# Patient Record
Sex: FEMALE | Race: WHITE | NOT HISPANIC OR LATINO | URBAN - METROPOLITAN AREA
[De-identification: names, ages, dates, MRNs, and addresses within clinical notes are randomized per-mention and may not be internally consistent; named-entity substitution may affect disease eponyms.]

---

## 2023-02-19 ENCOUNTER — EMERGENCY (EMERGENCY)
Facility: HOSPITAL | Age: 58
LOS: 0 days | Discharge: HOME | End: 2023-02-19
Attending: EMERGENCY MEDICINE
Payer: COMMERCIAL

## 2023-02-19 VITALS
SYSTOLIC BLOOD PRESSURE: 105 MMHG | DIASTOLIC BLOOD PRESSURE: 55 MMHG | RESPIRATION RATE: 18 BRPM | HEART RATE: 69 BPM | OXYGEN SATURATION: 100 % | WEIGHT: 119.93 LBS | TEMPERATURE: 97 F | HEIGHT: 64 IN

## 2023-02-19 VITALS
SYSTOLIC BLOOD PRESSURE: 108 MMHG | TEMPERATURE: 98 F | DIASTOLIC BLOOD PRESSURE: 53 MMHG | RESPIRATION RATE: 18 BRPM | HEART RATE: 73 BPM | OXYGEN SATURATION: 96 %

## 2023-02-19 DIAGNOSIS — R11.2 NAUSEA WITH VOMITING, UNSPECIFIED: ICD-10-CM

## 2023-02-19 DIAGNOSIS — F10.99 ALCOHOL USE, UNSPECIFIED WITH UNSPECIFIED ALCOHOL-INDUCED DISORDER: ICD-10-CM

## 2023-02-19 LAB
ALBUMIN SERPL ELPH-MCNC: 3.4 G/DL — SIGNIFICANT CHANGE UP (ref 3.3–5)
ALP SERPL-CCNC: 48 U/L — SIGNIFICANT CHANGE UP (ref 40–120)
ALT FLD-CCNC: 24 U/L — SIGNIFICANT CHANGE UP (ref 12–78)
ANION GAP SERPL CALC-SCNC: 9 MMOL/L — SIGNIFICANT CHANGE UP (ref 5–17)
AST SERPL-CCNC: 21 U/L — SIGNIFICANT CHANGE UP (ref 15–37)
BASOPHILS # BLD AUTO: 0.05 K/UL — SIGNIFICANT CHANGE UP (ref 0–0.2)
BASOPHILS NFR BLD AUTO: 0.9 % — SIGNIFICANT CHANGE UP (ref 0–2)
BILIRUB SERPL-MCNC: <0.1 MG/DL — LOW (ref 0.2–1.2)
BUN SERPL-MCNC: 20 MG/DL — SIGNIFICANT CHANGE UP (ref 7–23)
CALCIUM SERPL-MCNC: 8.3 MG/DL — LOW (ref 8.5–10.1)
CHLORIDE SERPL-SCNC: 113 MMOL/L — HIGH (ref 96–108)
CO2 SERPL-SCNC: 20 MMOL/L — LOW (ref 22–31)
CREAT SERPL-MCNC: 0.9 MG/DL — SIGNIFICANT CHANGE UP (ref 0.5–1.3)
EGFR: 76 ML/MIN/1.73M2 — SIGNIFICANT CHANGE UP
EOSINOPHIL # BLD AUTO: 0.22 K/UL — SIGNIFICANT CHANGE UP (ref 0–0.5)
EOSINOPHIL NFR BLD AUTO: 3.8 % — SIGNIFICANT CHANGE UP (ref 0–6)
ETHANOL SERPL-MCNC: 236 MG/DL — HIGH (ref 0–10)
GLUCOSE SERPL-MCNC: 109 MG/DL — HIGH (ref 70–99)
HCT VFR BLD CALC: 39.4 % — SIGNIFICANT CHANGE UP (ref 34.5–45)
HGB BLD-MCNC: 12.8 G/DL — SIGNIFICANT CHANGE UP (ref 11.5–15.5)
IMM GRANULOCYTES NFR BLD AUTO: 0.3 % — SIGNIFICANT CHANGE UP (ref 0–0.9)
LIDOCAIN IGE QN: 105 U/L — SIGNIFICANT CHANGE UP (ref 73–393)
LYMPHOCYTES # BLD AUTO: 2 K/UL — SIGNIFICANT CHANGE UP (ref 1–3.3)
LYMPHOCYTES # BLD AUTO: 34.2 % — SIGNIFICANT CHANGE UP (ref 13–44)
MCHC RBC-ENTMCNC: 30.5 PG — SIGNIFICANT CHANGE UP (ref 27–34)
MCHC RBC-ENTMCNC: 32.5 GM/DL — SIGNIFICANT CHANGE UP (ref 32–36)
MCV RBC AUTO: 94 FL — SIGNIFICANT CHANGE UP (ref 80–100)
MONOCYTES # BLD AUTO: 0.66 K/UL — SIGNIFICANT CHANGE UP (ref 0–0.9)
MONOCYTES NFR BLD AUTO: 11.3 % — SIGNIFICANT CHANGE UP (ref 2–14)
NEUTROPHILS # BLD AUTO: 2.89 K/UL — SIGNIFICANT CHANGE UP (ref 1.8–7.4)
NEUTROPHILS NFR BLD AUTO: 49.5 % — SIGNIFICANT CHANGE UP (ref 43–77)
PLATELET # BLD AUTO: 142 K/UL — LOW (ref 150–400)
POTASSIUM SERPL-MCNC: 3.1 MMOL/L — LOW (ref 3.5–5.3)
POTASSIUM SERPL-SCNC: 3.1 MMOL/L — LOW (ref 3.5–5.3)
PROT SERPL-MCNC: 6.6 GM/DL — SIGNIFICANT CHANGE UP (ref 6–8.3)
RBC # BLD: 4.19 M/UL — SIGNIFICANT CHANGE UP (ref 3.8–5.2)
RBC # FLD: 12.1 % — SIGNIFICANT CHANGE UP (ref 10.3–14.5)
SODIUM SERPL-SCNC: 142 MMOL/L — SIGNIFICANT CHANGE UP (ref 135–145)
TROPONIN I, HIGH SENSITIVITY RESULT: 5.78 NG/L — SIGNIFICANT CHANGE UP
WBC # BLD: 5.84 K/UL — SIGNIFICANT CHANGE UP (ref 3.8–10.5)
WBC # FLD AUTO: 5.84 K/UL — SIGNIFICANT CHANGE UP (ref 3.8–10.5)

## 2023-02-19 PROCEDURE — 72125 CT NECK SPINE W/O DYE: CPT | Mod: 26,MA

## 2023-02-19 PROCEDURE — 84484 ASSAY OF TROPONIN QUANT: CPT

## 2023-02-19 PROCEDURE — 72125 CT NECK SPINE W/O DYE: CPT | Mod: MA

## 2023-02-19 PROCEDURE — 99285 EMERGENCY DEPT VISIT HI MDM: CPT

## 2023-02-19 PROCEDURE — 36415 COLL VENOUS BLD VENIPUNCTURE: CPT

## 2023-02-19 PROCEDURE — 71045 X-RAY EXAM CHEST 1 VIEW: CPT

## 2023-02-19 PROCEDURE — 70450 CT HEAD/BRAIN W/O DYE: CPT | Mod: 26,MA

## 2023-02-19 PROCEDURE — 80307 DRUG TEST PRSMV CHEM ANLYZR: CPT

## 2023-02-19 PROCEDURE — 71045 X-RAY EXAM CHEST 1 VIEW: CPT | Mod: 26

## 2023-02-19 PROCEDURE — 96374 THER/PROPH/DIAG INJ IV PUSH: CPT

## 2023-02-19 PROCEDURE — 99053 MED SERV 10PM-8AM 24 HR FAC: CPT

## 2023-02-19 PROCEDURE — 83690 ASSAY OF LIPASE: CPT

## 2023-02-19 PROCEDURE — 80053 COMPREHEN METABOLIC PANEL: CPT

## 2023-02-19 PROCEDURE — 70450 CT HEAD/BRAIN W/O DYE: CPT | Mod: MA

## 2023-02-19 PROCEDURE — 96375 TX/PRO/DX INJ NEW DRUG ADDON: CPT

## 2023-02-19 PROCEDURE — 85025 COMPLETE CBC W/AUTO DIFF WBC: CPT

## 2023-02-19 PROCEDURE — 99284 EMERGENCY DEPT VISIT MOD MDM: CPT | Mod: 25

## 2023-02-19 RX ORDER — ONDANSETRON 8 MG/1
4 TABLET, FILM COATED ORAL ONCE
Refills: 0 | Status: COMPLETED | OUTPATIENT
Start: 2023-02-19 | End: 2023-02-19

## 2023-02-19 RX ORDER — POTASSIUM CHLORIDE 20 MEQ
40 PACKET (EA) ORAL ONCE
Refills: 0 | Status: COMPLETED | OUTPATIENT
Start: 2023-02-19 | End: 2023-02-19

## 2023-02-19 RX ORDER — SODIUM CHLORIDE 9 MG/ML
1000 INJECTION INTRAMUSCULAR; INTRAVENOUS; SUBCUTANEOUS ONCE
Refills: 0 | Status: COMPLETED | OUTPATIENT
Start: 2023-02-19 | End: 2023-02-19

## 2023-02-19 RX ORDER — FAMOTIDINE 10 MG/ML
20 INJECTION INTRAVENOUS ONCE
Refills: 0 | Status: COMPLETED | OUTPATIENT
Start: 2023-02-19 | End: 2023-02-19

## 2023-02-19 RX ADMIN — FAMOTIDINE 20 MILLIGRAM(S): 10 INJECTION INTRAVENOUS at 01:25

## 2023-02-19 RX ADMIN — SODIUM CHLORIDE 1000 MILLILITER(S): 9 INJECTION INTRAMUSCULAR; INTRAVENOUS; SUBCUTANEOUS at 01:25

## 2023-02-19 RX ADMIN — Medication 40 MILLIEQUIVALENT(S): at 06:10

## 2023-02-19 RX ADMIN — ONDANSETRON 4 MILLIGRAM(S): 8 TABLET, FILM COATED ORAL at 01:00

## 2023-02-19 NOTE — ED PROVIDER NOTE - PATIENT PORTAL LINK FT
You can access the FollowMyHealth Patient Portal offered by [facility] by registering at the following website: [email or web address]. By joining [email or web address]’s FollowMyHealth portal, you will also be able to view your health information using other applications (apps) compatible with our system.

## 2023-02-19 NOTE — ED PROVIDER NOTE - NSFOLLOWUPINSTRUCTIONS_ED_ALL_ED_FT
Nausea and Vomiting, Adult      Nausea is feeling that you have an upset stomach and that you are about to vomit. Vomiting is when food in your stomach forcefully comes out of your mouth. Vomiting can make you feel weak. If you vomit, or if you are not able to drink enough fluids, you may not have enough water in your body (get dehydrated). If you do not have enough water in your body, you may:  •Feel tired.       •Feel thirsty.       •Have a dry mouth.      •Have cracked lips.      •Pee (urinate) less often.      Older adults and people with other diseases or a weak body defense system (immune system) are at higher risk for not having enough water in the body. If you feel like you may vomit or you vomit, it is important to follow instructions from your doctor about how to take care of yourself.      Follow these instructions at home:    Watch your symptoms for any changes. Tell your doctor about them.      Eating and drinking       A bottle of clear fruit juice and glass of water.       A sign showing that a person should not drink alcohol.     •Take an ORS (oral rehydration solution). This is a drink that is sold at pharmacies and stores.    •Drink clear fluids in small amounts as you are able, such as:  •Water.      •Ice chips.      •Fruit juice that has water added (diluted fruit juice).      •Low-calorie sports drinks.      •Eat bland, easy-to-digest foods in small amounts as you are able, such as:  •Bananas.      •Applesauce.      •Rice.      •Low-fat (lean) meats.      •Toast.      •Crackers.        •Avoid drinking fluids that have a lot of sugar or caffeine in them. This includes energy drinks, sports drinks, and soda.      •Avoid alcohol.      •Avoid spicy or fatty foods.      General instructions     •Take over-the-counter and prescription medicines only as told by your doctor.      •Drink enough fluid to keep your pee (urine) pale yellow.      •Wash your hands often with soap and water for at least 20 seconds. If you cannot use soap and water, use hand .      •Make sure that everyone in your home washes their hands well and often.      •Rest at home until you feel better.      •Watch your condition for any changes.      •Take slow and deep breaths when you feel like you may vomit.      •Keep all follow-up visits.        Contact a doctor if:    •Your symptoms get worse.      •You have new symptoms.      •You have a fever.      •You cannot drink fluids without vomiting.      •You feel like you may vomit for more than 2 days.      •You feel light-headed or dizzy.      •You have a headache.      •You have muscle cramps.      •You have a rash.      •You have pain while peeing.        Get help right away if:    •You have pain in your chest, neck, arm, or jaw.      •You feel very weak or you faint.      •You vomit again and again.      •You have vomit that is bright red or looks like black coffee grounds.      •You have bloody or black poop (stools) or poop that looks like tar.      •You have a very bad headache, a stiff neck, or both.      •You have very bad pain, cramping, or bloating in your belly (abdomen).      •You have trouble breathing.      •You are breathing very quickly.      •Your heart is beating very quickly.      •Your skin feels cold and clammy.      •You feel confused.    •You have signs of losing too much water in your body, such as:  •Dark pee, very little pee, or no pee.      •Cracked lips.      •Dry mouth.      •Sunken eyes.      •Sleepiness.      •Weakness.        These symptoms may be an emergency. Get help right away. Call 911.   • Do not wait to see if the symptoms will go away.       • Do not drive yourself to the hospital.         Summary    •Nausea is feeling that you have an upset stomach and that you are about to vomit. Vomiting is when food in your stomach comes out of your mouth.      •Follow instructions from your doctor about eating and drinking.      •Take over-the-counter and prescription medicines only as told by your doctor.      •Contact your doctor if your symptoms get worse or you have new symptoms.      •Keep all follow-up visits.      This information is not intended to replace advice given to you by your health care provider. Make sure you discuss any questions you have with your health care provider.      Document Revised: 06/24/2022 Document Reviewed: 06/24/2022    [employer] Patient Education © 2022 [employer]

## 2023-02-19 NOTE — ED PROVIDER NOTE - PROGRESS NOTE DETAILS
geoffrey Aj [phone]. KORI Soto DO The patient is clinically sober. The patient is alert and oriented x 3, is clear and coherent in conversation and has a normal gait and shows no signs of acute intoxication. The patient is safe for discharge.  KORI Soto DO

## 2023-02-19 NOTE — ED PROVIDER NOTE - CLINICAL SUMMARY MEDICAL DECISION MAKING FREE TEXT BOX
pt with n/v secondary to excessive alcohol intake, no visible signs of trauma however will get ct head r/o ich, zofran for n/v, icf re evaliuation

## 2023-02-19 NOTE — ED PROVIDER NOTE - PHYSICAL EXAMINATION
Gen:  altered vomiting  Head:  NC/AT  HEENT: pupils perrl,  Cardiac: S1S2, RRR  Abd: Soft, non tender  Resp: No distress, CTA   musculoskeletal:: no deformities, no swelling, strength +5/+5  Skin: warm and dry as visualized, no rashes  Neuro: PAREKH, aao x 4  Psych:lethargic, able to be aroused

## 2023-02-19 NOTE — ED ADULT NURSE NOTE - OBJECTIVE STATEMENT
Pt ambulatory to triage, c/o falling down 2 stairs. +head strike. Endorses headache. Noted 1cm lac to the top of head, no active bleeding. Denies LOC or anticoagulant/ASA use and N/V.

## 2023-02-19 NOTE — ED PROVIDER NOTE - OBJECTIVE STATEMENT
56 yo female bib  for nausea and vomiting tonight. He states she went out with  friends for drinks and had a few drinks and began vomiting.